# Patient Record
Sex: MALE | Race: WHITE | NOT HISPANIC OR LATINO
[De-identification: names, ages, dates, MRNs, and addresses within clinical notes are randomized per-mention and may not be internally consistent; named-entity substitution may affect disease eponyms.]

---

## 2020-07-17 ENCOUNTER — APPOINTMENT (OUTPATIENT)
Dept: NEUROLOGY | Facility: CLINIC | Age: 65
End: 2020-07-17
Payer: COMMERCIAL

## 2020-07-17 VITALS
DIASTOLIC BLOOD PRESSURE: 75 MMHG | HEART RATE: 65 BPM | BODY MASS INDEX: 37.49 KG/M2 | SYSTOLIC BLOOD PRESSURE: 139 MMHG | HEIGHT: 65 IN | WEIGHT: 225 LBS

## 2020-07-17 DIAGNOSIS — Z87.718 PERSONAL HISTORY OF OTHER SPECIFIED (CORRECTED) CONGENITAL MALFORMATIONS OF GENITOURINARY SYSTEM: ICD-10-CM

## 2020-07-17 DIAGNOSIS — G93.0 CEREBRAL CYSTS: ICD-10-CM

## 2020-07-17 PROBLEM — Z00.00 ENCOUNTER FOR PREVENTIVE HEALTH EXAMINATION: Status: ACTIVE | Noted: 2020-07-17

## 2020-07-17 PROCEDURE — 99244 OFF/OP CNSLTJ NEW/EST MOD 40: CPT

## 2020-07-17 RX ORDER — SERTRALINE HYDROCHLORIDE 25 MG/1
TABLET, FILM COATED ORAL
Refills: 0 | Status: ACTIVE | COMMUNITY

## 2020-07-17 RX ORDER — TRIAMTERENE AND HYDROCHLOROTHIAZIDE 37.5; 25 MG/1; MG/1
CAPSULE ORAL
Refills: 0 | Status: ACTIVE | COMMUNITY

## 2020-07-17 NOTE — CONSULT LETTER
[Dear  ___] : Dear  [unfilled], [Consult Letter:] : I had the pleasure of evaluating your patient, [unfilled]. [Please see my note below.] : Please see my note below. [Consult Closing:] : Thank you very much for allowing me to participate in the care of this patient.  If you have any questions, please do not hesitate to contact me. [Sincerely,] : Sincerely, [DrKeiry  ___] : Dr. MCMAHON [FreeTextEntry3] : Evelin Givens M.D.\par

## 2020-07-17 NOTE — REASON FOR VISIT
[Consultation] : a consultation visit [FreeTextEntry1] : clearance for surgery for right distal radius ORIF on 7/20/20.

## 2020-07-17 NOTE — HISTORY OF PRESENT ILLNESS
[FreeTextEntry1] : Khari Mohamud is a 64 year old man with a history of atrial septal defect, renal hypertension, and polycystic kidney disease. He suffered a fall on 7/10/20 that resulted in a right wrist fracture.  Currently, he has a right forearm cast sling.  He plans to have surgery for right distal radius ORIF on 7/20/20 and presents today for neurological clearance. \par \par He had a atrial septal defect repair at age 10.  He has not seen a genetic specialist.\par \par He reports anxiety and attention deficit disorder that impairs his memory.  \par \par He has baseline hearing loss that has been present since birth. \par \par Mr. Mohamud denies dizziness, numbness, tingling, change in speech or vision or any new neurological symptoms. \par \par The remaining neurological review of systems is negative. \par

## 2020-07-17 NOTE — PHYSICAL EXAM
[FreeTextEntry1] : Physical examination \par General: No acute distress, Awake, Alert.   \par Body habitus consistent with congenital disorder?\par \par Mental status \par Awake, alert, and oriented to person, time and place, Normal attention span and concentration, Recent and remote memory intact, Language intact.  \par \par Cranial Nerves \par II: VFF  \par III, IV, VI: PERRL, EOMI.   Right ptosis. Left eye abducts with upgaze. Strabismus. \par V: Facial sensation is normal B/L.   \par VII: Facial strength is normal B/L. \par \par \par VIII: Decreased hearing, bilaterally. \par \par IX, X: Palate is midline and elevates symmetrically.   \par XI: Trapezius normal strength.  Limited on right due to sling. \par XII: Tongue midline without atrophy or fasciculations. \par \par Motor exam  \par Muscle tone - no evidence of rigidity or resistance in all 4 extremities.  \par No atrophy or fasciculations \par Muscle Strength: arms and legs, proximal and distal flexors and extensors are normal \par \par No UE drift.\par \par Reflexes \par All present, normal, and symmetrical.    \par \par Plantars right: mute.   \par Plantars left: mute.   \par  \par \par Coordination \par Slow, no ataxia (baseline as per patient) - FNF, ASIM, HKS. \par \par Sensory \par Intact sensation to vibration and cold.\par \par Gait \par Slow, wide based gait. \par \par

## 2020-07-17 NOTE — ASSESSMENT
[FreeTextEntry1] : Khari Mohamud is a 64 year old man with a right radius fracture. \par \par History of polycystic kidney disease- MRA head and neck w/o to rule out any aneurysms. \par \par Previous brain MRI most consistent with enlarged perivascular space - repeat MRI Brain w/o. \par \par Cognitive impairment - may be his baseline - continue to follow clinically. \par \par No neurological contraindication to necessary surgery. \par \par Follow up PRN.

## 2020-07-21 NOTE — CONSULT LETTER
[FreeTextEntry1] : I had the pleasure of evaluating your patient, LUPE DICKENS, Please see the assessment section below for a summary of my diagnostic impression and plan.\par \par Thank you very much for allowing me to participate in the care of this patient. If you have any questions, please do not hesitate to contact me.\par \par Sincerely,\par \par Evelin Givens MD\par \par \par